# Patient Record
Sex: MALE | Race: WHITE
[De-identification: names, ages, dates, MRNs, and addresses within clinical notes are randomized per-mention and may not be internally consistent; named-entity substitution may affect disease eponyms.]

---

## 2019-01-31 ENCOUNTER — HOSPITAL ENCOUNTER (OUTPATIENT)
Dept: HOSPITAL 80 - CIMAGING | Age: 59
End: 2019-01-31
Attending: INTERNAL MEDICINE
Payer: COMMERCIAL

## 2019-01-31 DIAGNOSIS — I77.71: Primary | ICD-10-CM

## 2019-01-31 DIAGNOSIS — E78.5: ICD-10-CM

## 2019-02-01 ENCOUNTER — HOSPITAL ENCOUNTER (OUTPATIENT)
Dept: HOSPITAL 80 - FIMAGING | Age: 59
End: 2019-02-01
Attending: INTERNAL MEDICINE
Payer: COMMERCIAL

## 2019-02-01 DIAGNOSIS — I72.9: Primary | ICD-10-CM

## 2019-02-08 ENCOUNTER — HOSPITAL ENCOUNTER (OUTPATIENT)
Dept: HOSPITAL 80 - FSGY | Age: 59
Discharge: HOME | End: 2019-02-08
Attending: SURGERY
Payer: COMMERCIAL

## 2019-02-08 VITALS — SYSTOLIC BLOOD PRESSURE: 119 MMHG | DIASTOLIC BLOOD PRESSURE: 76 MMHG

## 2019-02-08 DIAGNOSIS — I77.71: ICD-10-CM

## 2019-02-08 DIAGNOSIS — I72.4: Primary | ICD-10-CM

## 2019-02-08 DIAGNOSIS — Z86.73: ICD-10-CM

## 2019-02-08 DIAGNOSIS — Z96.643: ICD-10-CM

## 2019-02-08 DIAGNOSIS — Z79.01: ICD-10-CM

## 2019-02-08 DIAGNOSIS — Z82.49: ICD-10-CM

## 2019-02-08 DIAGNOSIS — E78.5: ICD-10-CM

## 2019-02-08 DIAGNOSIS — K21.9: ICD-10-CM

## 2019-02-08 LAB
INR PPP: (no result) (ref 0.83–1.16)
INR PPP: 1.1 (ref 0.83–1.16)
PROTHROMBIN TIME: (no result) SEC (ref 12–15)
PROTHROMBIN TIME: 14.4 SEC (ref 12–15)

## 2019-02-08 PROCEDURE — 04VK0ZZ RESTRICTION OF RIGHT FEMORAL ARTERY, OPEN APPROACH: ICD-10-PCS | Performed by: SURGERY

## 2019-02-08 NOTE — PDANEPAE
ANE History of Present Illness





femoral pseudoaneurysm





ANE Past Medical History





- Cardiovascular History


Hx Hypertension: No


Hx Arrhythmias: No


Hx Chest Pain: No


Hx Coronary Artery / Peripheral Vascular Disease: No


Hx CHF / Valvular Disease: No


Hx Palpitations: No


Cardiovascular History Comment: shunt





- Pulmonary History


Hx COPD: No


Hx Asthma/Reactive Airway Disease: No


Hx Recent Upper Respiratory Infection: No


Hx Oxygen in Use at Home: No


Hx Sleep Apnea: No


Sleep Apnea Screening Result - Last Documented: Positive





- Neurologic History


Hx Cerebrovascular Accident: Yes


Hx Seizures: No


Hx Dementia: No


Neurologic History Comment: TIA multiple.  stroke





- Endocrine History


Hx Diabetes: No


Hypothyroid: No


Hyperthyroid: No


Obesity: no





- Renal History


Hx Renal Disorders: No





- Liver History


Hx Hepatic Disorders: No





- Neurological & Psychiatric Hx


Hx Neurological and Psychiatric Disorders: Yes


Neurological / Psychiatric History Comment: little flattening on right cheek.  

& knee





- Cancer History


Hx Cancer: No





- Congenital Disorder History


Hx Congenital Disorders: No





- GI History


GERD: mild


Hx Gastrointestinal Disorders: Yes


Gastrointestinal History Comment: GERD





- Other Health History


Other Health History: psoriasis.  glaucoma





- Chronic Pain History


Chronic Pain: No





- Surgical History


Prior Surgeries: embolectomy for embolis MCA stroke.  2012 [facility]





ANE Review of Systems


Review of systems is: negative


Review of Systems: 








- Exercise capacity


METS (RN): 6 METS





ANE Patient History





- Allergies


Allergies/Adverse Reactions: 








No Known Allergies Allergy (Verified 02/07/19 14:29)


 








- Home Medications


Home medications: home medication list seen and reviewed


Home Medications: 








Cholecalciferol (Vitamin D3)  02/07/19 [Last Taken 02/07/19]


Crestor  02/07/19 [Last Taken 02/07/19]


Enoxaparin Sodium  02/07/19 [Last Taken 02/07/19]


Lidoderm 5% Patch  02/07/19 [Last Taken 01/31/19]


Multivitamins W-Iron  02/07/19 [Last Taken 02/07/19]


Nexium  02/07/19 [Last Taken 02/07/19]


Valium 2 MG (*)  02/07/19 [Last Taken 02/05/19]


Warfarin Sodium  02/07/19 [Last Taken 02/04/19]


Xalatan 0.005% (*)  02/07/19 [Last Taken 02/07/19]








- NPO status


NPO Status: no food or drink >8 hours





- Anes Hx


Anes Hx: no prior problems





- Smoking Hx


Smoking Status: Never smoked





- Alcohol Use


Alcohol Use: Rarely





- Family Anes Hx


Family Anes Hx: none


Family Hx Anesthesia Complications: none





ANE Labs/Vital Signs





- Vital Signs


Height: 167.64 cm


Weight: 83.007 kg





ANE Physical Exam





- Airway


Neck exam: FROM


Mallampati Score: Class 2


Mouth exam: normal dental/mouth exam





- Pulmonary


Pulmonary: no respiratory distress, clear to auscultation





- Cardiovascular


Cardiovascular: regular rate and rhythym, no murmur, rub, or gallop





- ASA Status


ASA Status: III





ANE Anesthesia Plan


Anesthesia Plan: GA with mask, MAC

## 2019-02-08 NOTE — GOP
[f 
rep st]



                                                                OPERATIVE REPORT





DATE OF OPERATION:  02/08/2019



SURGEON:  Robi Solis MD



ASSISTANT:  Robbie Ureña MD.



ANESTHESIA:  MAC.



ANESTHESIOLOGIST:  Floyd Diaz MD.



PREOPERATIVE DIAGNOSIS:  Right common femoral artery pseudoaneurysm.



POSTOPERATIVE DIAGNOSIS:  Right common femoral artery pseudoaneurysm.



PROCEDURE PERFORMED:  Direct repair of right common femoral artery 
pseudoaneurysm.



FINDINGS:  See below.



INDICATIONS:  58-year-old male with a recent history of bilateral carotid 
artery dissections.  He underwent a cerebral micro thrombectomy via a right 
groin approach.  He has developed a large right common femoral artery 
pseudoaneurysm.  A thrombin injection was placed last week with initial sealing 
and rapid reopening of his sac with a large suspect secondary lateral wall 
opening.  Given the options of attempted repeat injection versus open surgical 
repair, he has opted to undergo open repair at this time.  Risks and benefits 
were explained including bleeding, infection, recurrence, nerve injury, as well 
as others.  All questions were answered.  He desires to proceed.



DESCRIPTION OF PROCEDURE:  Monitored anesthesia care was started.  The right 
groin was infiltrated 1% lidocaine and 0.5% Marcaine.  A longitudinal incision 
was created.  The common femoral artery, as well as superficial and profunda 
arteries were all tediously dissected out.  The common femoral was able to be 
gained access to just beneath the level of the inguinal ligament.  The artery 
was soft without calcific plaque at this location.  There was a large 6 cm 
pseudoaneurysm easily identified.  The superficial femoral artery was tediously 
dissected distal to this site.  There were extremely concrete adhesions from 
his prior manipulations and attempt at a prior closure.  The superficial 
femoral artery was safely able to be encompassed with vessel loops.  The 
lateral wall of the femoral artery was dissected down towards the profunda 
femoris artery, which was able to be encompassed with a vessel loop.  A heparin 
bolus was administered.  Occluding clamps were placed.  The aneurysm sac was 
initially dissected out further.  There were 2 areas of opening, 1 on the 
medial wall of the artery accounting for the more recent US findings, as well 
as on the anterior wall, both feeding into the large sac itself, the lateral 
opening likely accounting for the persistent patency post thrombin injection.  
The lateral defect was closed with a running Prolene suture primarily.  The 
secondary anterior defect was tediously dissected out.  Vascular control was 
somewhat hampered by side branch backbleeding, which was unable to be 
identified through the scarred-up groin.  Using manual compression, the defect 
was able to be snuck up upon, both proximally and distally where the anterior 
wall of the artery was closed with a running Prolene suture as well.  Upon 
release of occluding clamps, there was excellent hemostasis assured.  The 
arteries had been fore bled and back bled prior to reestablishing flow 
initially through the profunda femoris and secondarily through the superficial 
femoral artery.  Satisfactory hemostasis was assured throughout.  Malvin was 
placed throughout the wound cavity.  The defect was closed in layers with 
running absorbable suture followed by Dermabond.  The patient was taken to the 
recovery room awake in good condition with a palpable pedal pulse.





Job #:  [id number]

MTDD

## 2019-02-08 NOTE — POSTOPPROG
Post Op Note


Date of Operation: 02/08/19


Surgeon: Robi Solis


Assistant: Robbie Ureña


Anesthesiologist: Floyd Diaz


Anesthesia: IV Sedation


Pre-op Diagnosis: Right CFA pseudoaneurysm


Post-op Diagnosis: Same


Procedure: Repair right CFA pseudoaneurysm


Findings: anterior wall CFA and lateral wall CFA punctures 


Inf/Abcess present in the surg proc area at time of surgery?: No


EBL: [id number]


Complications: 





no immediate


Specimen(s): 





none

## 2019-04-10 ENCOUNTER — HOSPITAL ENCOUNTER (OUTPATIENT)
Dept: HOSPITAL 80 - CIMAGING | Age: 59
End: 2019-04-10
Attending: GENERAL ACUTE CARE HOSPITAL
Payer: COMMERCIAL

## 2019-04-10 DIAGNOSIS — Z09: Primary | ICD-10-CM

## 2019-04-10 DIAGNOSIS — I77.71: ICD-10-CM

## 2019-04-10 DIAGNOSIS — I72.9: ICD-10-CM

## 2019-06-11 ENCOUNTER — HOSPITAL ENCOUNTER (OUTPATIENT)
Dept: HOSPITAL 80 - EMCIMAGING | Age: 59
End: 2019-06-11
Payer: COMMERCIAL